# Patient Record
Sex: FEMALE | Race: WHITE | NOT HISPANIC OR LATINO | Employment: OTHER | URBAN - NONMETROPOLITAN AREA
[De-identification: names, ages, dates, MRNs, and addresses within clinical notes are randomized per-mention and may not be internally consistent; named-entity substitution may affect disease eponyms.]

---

## 2021-03-08 ENCOUNTER — IMMUNIZATION (OUTPATIENT)
Dept: VACCINE CLINIC | Facility: HOSPITAL | Age: 66
End: 2021-03-08

## 2021-03-08 PROCEDURE — 91300 HC SARSCOV02 VAC 30MCG/0.3ML IM: CPT | Performed by: INTERNAL MEDICINE

## 2021-03-08 PROCEDURE — 0001A: CPT | Performed by: INTERNAL MEDICINE

## 2021-03-29 ENCOUNTER — IMMUNIZATION (OUTPATIENT)
Dept: VACCINE CLINIC | Facility: HOSPITAL | Age: 66
End: 2021-03-29

## 2021-03-29 PROCEDURE — 91300 HC SARSCOV02 VAC 30MCG/0.3ML IM: CPT | Performed by: INTERNAL MEDICINE

## 2021-03-29 PROCEDURE — 0002A: CPT | Performed by: INTERNAL MEDICINE

## 2021-11-22 ENCOUNTER — TELEPHONE (OUTPATIENT)
Dept: ORTHOPEDIC SURGERY | Facility: CLINIC | Age: 66
End: 2021-11-22

## 2021-11-22 DIAGNOSIS — M25.561 RIGHT KNEE PAIN, UNSPECIFIED CHRONICITY: Primary | ICD-10-CM

## 2021-12-08 ENCOUNTER — TELEPHONE (OUTPATIENT)
Dept: ORTHOPEDIC SURGERY | Facility: CLINIC | Age: 66
End: 2021-12-08

## 2021-12-08 NOTE — TELEPHONE ENCOUNTER
Called to remind patient about her appt with Dr. Valerio tomorrow, she stated that she received a msg reminding her and told me that she will be there. Knows to come in at 2:30.

## 2021-12-09 ENCOUNTER — OFFICE VISIT (OUTPATIENT)
Dept: ORTHOPEDIC SURGERY | Facility: CLINIC | Age: 66
End: 2021-12-09

## 2021-12-09 ENCOUNTER — HOSPITAL ENCOUNTER (OUTPATIENT)
Dept: GENERAL RADIOLOGY | Facility: HOSPITAL | Age: 66
Discharge: HOME | End: 2021-12-09
Admitting: FAMILY MEDICINE

## 2021-12-09 VITALS
BODY MASS INDEX: 47.84 KG/M2 | DIASTOLIC BLOOD PRESSURE: 85 MMHG | SYSTOLIC BLOOD PRESSURE: 154 MMHG | WEIGHT: 260 LBS | HEIGHT: 62 IN | HEART RATE: 68 BPM

## 2021-12-09 DIAGNOSIS — G89.29 CHRONIC PAIN OF RIGHT KNEE: Primary | ICD-10-CM

## 2021-12-09 DIAGNOSIS — M25.561 CHRONIC PAIN OF RIGHT KNEE: Primary | ICD-10-CM

## 2021-12-09 DIAGNOSIS — M17.11 PRIMARY OSTEOARTHRITIS OF RIGHT KNEE: ICD-10-CM

## 2021-12-09 DIAGNOSIS — M25.561 RIGHT KNEE PAIN, UNSPECIFIED CHRONICITY: ICD-10-CM

## 2021-12-09 PROCEDURE — 99203 OFFICE O/P NEW LOW 30 MIN: CPT | Performed by: FAMILY MEDICINE

## 2021-12-09 PROCEDURE — 73562 X-RAY EXAM OF KNEE 3: CPT | Performed by: RADIOLOGY

## 2021-12-09 PROCEDURE — 73562 X-RAY EXAM OF KNEE 3: CPT

## 2021-12-09 RX ORDER — DICLOFENAC SODIUM 75 MG/1
75 TABLET, DELAYED RELEASE ORAL EVERY 12 HOURS PRN
Qty: 60 TABLET | Refills: 2 | Status: SHIPPED | OUTPATIENT
Start: 2021-12-09 | End: 2022-04-13 | Stop reason: SDUPTHER

## 2021-12-09 RX ORDER — DICLOFENAC SODIUM 75 MG/1
TABLET, DELAYED RELEASE ORAL
COMMUNITY
Start: 2021-11-05 | End: 2021-12-09 | Stop reason: SDUPTHER

## 2021-12-09 NOTE — PROGRESS NOTES
New Patient Visit      Patient: Emily Wilkins  YOB: 1955  Date of Encounter: 12/09/2021  PCP: Provider, No Known  Referring Provider: JASPER Eduardo   Emily Wilkins is a 66 y.o. female who presents to the office today for evaluation of Pain and Initial Evaluation of the Right Knee      Chief Complaint   Patient presents with   • Right Knee - Pain, Initial Evaluation       HPI   new patient presents complaining of chronic intermittent right knee pain for many years which has been severe and constant for the last few months without any specific injury.  Right knee seems to swell and is very painful when she tries to bend it.  Patient is having significant debilitating pain until she was started on diclofenac by her PCP which is helping notably.  Now pain is 4-5/10 and better at rest.    There is no problem list on file for this patient.      History reviewed. No pertinent past medical history.    History reviewed. No pertinent surgical history.    History reviewed. No pertinent family history.    Social History     Socioeconomic History   • Marital status: Unknown   Tobacco Use   • Smoking status: Never Smoker   • Smokeless tobacco: Never Used   Vaping Use   • Vaping Use: Never used   Substance and Sexual Activity   • Alcohol use: Never       Current Outpatient Medications   Medication Sig Dispense Refill   • diclofenac (VOLTAREN) 75 MG EC tablet Take 1 tablet by mouth Every 12 (Twelve) Hours As Needed (pain). 60 tablet 2   • Diclofenac Sodium (VOLTAREN) 1 % gel gel        No current facility-administered medications for this visit.       No Known Allergies    Review of Systems   Constitutional: Positive for activity change. Negative for fever.   Respiratory: Negative for shortness of breath and wheezing.    Cardiovascular: Negative for chest pain.   Musculoskeletal: Positive for arthralgias, gait problem, joint swelling and myalgias.   Skin: Negative for color change and wound.  "  Neurological: Negative for weakness and numbness.       Visit Vitals  /85   Pulse 68   Ht 157.5 cm (62\")   Wt 118 kg (260 lb)   BMI 47.55 kg/m²     66 y.o.female  Physical Exam  Vitals and nursing note reviewed.   Constitutional:       General: She is not in acute distress.     Appearance: Normal appearance. She is obese.   Pulmonary:      Effort: Pulmonary effort is normal. No respiratory distress.   Musculoskeletal:      Right hip: No tenderness. Normal range of motion.      Right knee: Swelling, bony tenderness and crepitus present. Decreased range of motion. Tenderness present over the medial joint line. No MCL laxity or ACL laxity. Normal pulse.      Comments: Severely restricted right knee range of motion with pain at end range.  Cannot fully extend and has approximately a 5 degree extension deficit.  Can only flex to about 45 degrees.  Very tender over medial joint line.  Palpation structures somewhat difficult due to body habitus.   Skin:     General: Skin is warm and dry.      Findings: No erythema.   Neurological:      General: No focal deficit present.      Mental Status: She is alert.      Sensory: No sensory deficit.      Motor: No weakness.         Radiology Results:    XR Knee 3 View Right    Result Date: 12/9/2021  Extensive degenerative osteoarthritis.   This report was finalized on 12/9/2021 2:13 PM by Dr. Michael Swain MD.        Assessment/Plan   Diagnoses and all orders for this visit:    1. Chronic pain of right knee (Primary)  -     diclofenac (VOLTAREN) 75 MG EC tablet; Take 1 tablet by mouth Every 12 (Twelve) Hours As Needed (pain).  Dispense: 60 tablet; Refill: 2    2. Primary osteoarthritis of right knee  -     diclofenac (VOLTAREN) 75 MG EC tablet; Take 1 tablet by mouth Every 12 (Twelve) Hours As Needed (pain).  Dispense: 60 tablet; Refill: 2         MEDS ORDERED DURING VISIT:  New Medications Ordered This Visit   Medications   • diclofenac (VOLTAREN) 75 MG EC tablet     Sig: " Take 1 tablet by mouth Every 12 (Twelve) Hours As Needed (pain).     Dispense:  60 tablet     Refill:  2     MEDICATION ISSUES:  Discussed medication options and treatment plan of prescribed medication as well as the risks, benefits, and side effects including potential falls, possible impaired driving and metabolic adversities among others. Patient is agreeable to call the office with any worsening of symptoms or onset of side effects. Patient is agreeable to call 911 or go to the nearest ER should he/she begin having SI/HI.     Discussion:  Patient has fairly severe arthritis of the right knee.  Spent time discussing various treatment options and patient would like to try nonsurgical route to start with.  We will start with home exercises and will continue taking her diclofenac.  Set of bracing, PT, further intervention.  Consider MRI.  Consider surgical referral although patient will need to get her BMI under 40.0 before surgery would be considered.  Follow-up in 1 month             This document has been electronically signed by Cesar Valerio DO   December 12, 2021 23:40 EST    Part of this note may be an electronic transcription/translation of spoken language to printed text using the Dragon Dictation System.

## 2021-12-15 ENCOUNTER — PATIENT ROUNDING (BHMG ONLY) (OUTPATIENT)
Dept: ORTHOPEDIC SURGERY | Facility: CLINIC | Age: 66
End: 2021-12-15

## 2021-12-15 NOTE — PROGRESS NOTES
December 15, 2021    Hello, may I speak with Emily Wilkins    My name is Nunu Patel     I am  with [facility] [place]  [facility]  [address]  [place] [address] [phone].    Before we get started may I verify your date of birth? 1955    I am calling to officially welcome you to our practice and ask about your recent visit. Is this a good time to talk? Yes    Tell me about your visit with us. What things went well?  The visit was fine.       We're always looking for ways to make our patients' experiences even better. Do you have recommendations on ways we may improve?  No    Overall were you satisfied with your first visit to our practice? Yes       I appreciate you taking the time to speak with me today. Is there anything else I can do for you? No      Thank you, and have a great day.

## 2022-04-11 ENCOUNTER — TELEPHONE (OUTPATIENT)
Dept: ORTHOPEDIC SURGERY | Facility: CLINIC | Age: 67
End: 2022-04-11

## 2022-04-11 NOTE — TELEPHONE ENCOUNTER
Called patient to see if she wouldn't mind coming later on wed and I explained it's because of the OMT appt ahead of her needs at least 30 min, said she doesn't mind. She is good to go. Verbalized understanding.

## 2022-04-13 ENCOUNTER — OFFICE VISIT (OUTPATIENT)
Dept: ORTHOPEDIC SURGERY | Facility: CLINIC | Age: 67
End: 2022-04-13

## 2022-04-13 VITALS
WEIGHT: 292.8 LBS | SYSTOLIC BLOOD PRESSURE: 178 MMHG | DIASTOLIC BLOOD PRESSURE: 88 MMHG | TEMPERATURE: 98.6 F | HEIGHT: 62 IN | BODY MASS INDEX: 53.88 KG/M2 | HEART RATE: 73 BPM

## 2022-04-13 DIAGNOSIS — E66.01 OBESITY, MORBID, BMI 50 OR HIGHER: ICD-10-CM

## 2022-04-13 DIAGNOSIS — M25.561 CHRONIC PAIN OF RIGHT KNEE: Primary | ICD-10-CM

## 2022-04-13 DIAGNOSIS — M17.11 PRIMARY OSTEOARTHRITIS OF RIGHT KNEE: ICD-10-CM

## 2022-04-13 DIAGNOSIS — G89.29 CHRONIC PAIN OF RIGHT KNEE: Primary | ICD-10-CM

## 2022-04-13 DIAGNOSIS — Z79.1 NSAID LONG-TERM USE: ICD-10-CM

## 2022-04-13 PROCEDURE — 99214 OFFICE O/P EST MOD 30 MIN: CPT | Performed by: FAMILY MEDICINE

## 2022-04-13 RX ORDER — DICLOFENAC SODIUM 75 MG/1
75 TABLET, DELAYED RELEASE ORAL EVERY 12 HOURS PRN
Qty: 60 TABLET | Refills: 2 | Status: SHIPPED | OUTPATIENT
Start: 2022-04-13 | End: 2022-08-31

## 2022-08-31 ENCOUNTER — OFFICE VISIT (OUTPATIENT)
Dept: ORTHOPEDIC SURGERY | Facility: CLINIC | Age: 67
End: 2022-08-31

## 2022-08-31 VITALS
HEART RATE: 67 BPM | BODY MASS INDEX: 53.92 KG/M2 | HEIGHT: 62 IN | DIASTOLIC BLOOD PRESSURE: 80 MMHG | SYSTOLIC BLOOD PRESSURE: 175 MMHG | WEIGHT: 293 LBS | OXYGEN SATURATION: 97 %

## 2022-08-31 DIAGNOSIS — M25.561 RIGHT KNEE PAIN, UNSPECIFIED CHRONICITY: ICD-10-CM

## 2022-08-31 DIAGNOSIS — E66.01 OBESITY, MORBID, BMI 50 OR HIGHER: ICD-10-CM

## 2022-08-31 DIAGNOSIS — Z79.1 NSAID LONG-TERM USE: ICD-10-CM

## 2022-08-31 DIAGNOSIS — M25.561 CHRONIC PAIN OF RIGHT KNEE: Primary | ICD-10-CM

## 2022-08-31 DIAGNOSIS — G89.29 CHRONIC PAIN OF RIGHT KNEE: Primary | ICD-10-CM

## 2022-08-31 DIAGNOSIS — M17.11 PRIMARY OSTEOARTHRITIS OF RIGHT KNEE: ICD-10-CM

## 2022-08-31 PROCEDURE — 99214 OFFICE O/P EST MOD 30 MIN: CPT | Performed by: FAMILY MEDICINE

## 2022-08-31 RX ORDER — ERGOCALCIFEROL 1.25 MG/1
1 CAPSULE ORAL
COMMUNITY
Start: 2022-08-22

## 2022-08-31 RX ORDER — METHOCARBAMOL 500 MG/1
TABLET, FILM COATED ORAL EVERY 12 HOURS
COMMUNITY
Start: 2022-08-15

## 2022-08-31 RX ORDER — LISINOPRIL 10 MG/1
TABLET ORAL
COMMUNITY
Start: 2022-08-15

## 2022-08-31 RX ORDER — MELOXICAM 7.5 MG/1
7.5-15 TABLET ORAL DAILY
Qty: 60 TABLET | Refills: 0 | Status: SHIPPED | OUTPATIENT
Start: 2022-08-31 | End: 2022-09-27

## 2022-08-31 RX ORDER — CLONIDINE HYDROCHLORIDE 0.1 MG/1
TABLET ORAL
COMMUNITY
Start: 2022-08-15

## 2022-09-26 DIAGNOSIS — M25.561 RIGHT KNEE PAIN, UNSPECIFIED CHRONICITY: ICD-10-CM

## 2022-09-26 DIAGNOSIS — M25.561 CHRONIC PAIN OF RIGHT KNEE: ICD-10-CM

## 2022-09-26 DIAGNOSIS — Z79.1 NSAID LONG-TERM USE: ICD-10-CM

## 2022-09-26 DIAGNOSIS — M17.11 PRIMARY OSTEOARTHRITIS OF RIGHT KNEE: ICD-10-CM

## 2022-09-26 DIAGNOSIS — E66.01 OBESITY, MORBID, BMI 50 OR HIGHER: ICD-10-CM

## 2022-09-26 DIAGNOSIS — G89.29 CHRONIC PAIN OF RIGHT KNEE: ICD-10-CM

## 2022-09-27 RX ORDER — MELOXICAM 15 MG/1
15 TABLET ORAL DAILY
Qty: 30 TABLET | Refills: 1 | Status: SHIPPED | OUTPATIENT
Start: 2022-09-27 | End: 2022-10-05 | Stop reason: SDUPTHER

## 2022-10-05 ENCOUNTER — OFFICE VISIT (OUTPATIENT)
Dept: ORTHOPEDIC SURGERY | Facility: CLINIC | Age: 67
End: 2022-10-05

## 2022-10-05 VITALS
SYSTOLIC BLOOD PRESSURE: 147 MMHG | OXYGEN SATURATION: 100 % | HEART RATE: 57 BPM | DIASTOLIC BLOOD PRESSURE: 80 MMHG | WEIGHT: 293 LBS | HEIGHT: 62 IN | TEMPERATURE: 97.1 F | BODY MASS INDEX: 53.92 KG/M2

## 2022-10-05 DIAGNOSIS — M17.11 PRIMARY OSTEOARTHRITIS OF RIGHT KNEE: ICD-10-CM

## 2022-10-05 DIAGNOSIS — Z79.1 NSAID LONG-TERM USE: ICD-10-CM

## 2022-10-05 DIAGNOSIS — M25.561 CHRONIC PAIN OF RIGHT KNEE: ICD-10-CM

## 2022-10-05 DIAGNOSIS — G89.29 CHRONIC PAIN OF RIGHT KNEE: ICD-10-CM

## 2022-10-05 DIAGNOSIS — M25.561 RIGHT KNEE PAIN, UNSPECIFIED CHRONICITY: ICD-10-CM

## 2022-10-05 DIAGNOSIS — E66.01 OBESITY, MORBID, BMI 50 OR HIGHER: ICD-10-CM

## 2022-10-05 PROCEDURE — 99214 OFFICE O/P EST MOD 30 MIN: CPT | Performed by: FAMILY MEDICINE

## 2022-10-05 RX ORDER — MELOXICAM 15 MG/1
15 TABLET ORAL DAILY
Qty: 30 TABLET | Refills: 2 | Status: SHIPPED | OUTPATIENT
Start: 2022-10-05 | End: 2023-01-11 | Stop reason: SDUPTHER

## 2022-10-05 NOTE — PROGRESS NOTES
"Follow Up Visit      Patient: Emily Wilkins  YOB: 1955  Date of Encounter: 10/05/2022  PCP: Yaquelin Ballard  Referring Provider: No ref. provider found     Subjective   Emily Wilkins is a 67 y.o. female who presents to the office today for evaluation of Pain and Follow-up of the Right Knee      Chief Complaint   Patient presents with   • Right Knee - Pain, Follow-up       HPI    There is no problem list on file for this patient.    The patient presents for follow-up of right knee pain. She was last seen on 8/31/2022. The patient was started on Mobic and given the option to increase the dose to 15 mg. She was also doing home exercises. She has been taking the meloxicam 15 mg with improvement in her pain and denies any GI issues with the medication. She reports her right knee pain has not worsened, but she still has some stiffness. She has been doing the home exercises with improvement. She has not received a brace yet.    History reviewed. No pertinent past medical history.    No Known Allergies    Review of Systems   Constitutional: Positive for activity change. Negative for fever.   Respiratory: Negative for shortness of breath and wheezing.    Cardiovascular: Negative for chest pain.   Musculoskeletal: Positive for arthralgias and myalgias.        Right knee pain.   Skin: Negative for color change and wound.   Neurological: Negative for weakness and numbness.       Visit Vitals  /80   Pulse 57   Temp 97.1 °F (36.2 °C)   Ht 157.5 cm (62\")   Wt 133 kg (293 lb)   SpO2 100%   BMI 53.59 kg/m²     67 y.o.female  Physical Exam  Vitals and nursing note reviewed.   Constitutional:       General: She is not in acute distress.     Appearance: Normal appearance.   Pulmonary:      Effort: Pulmonary effort is normal. No respiratory distress.   Musculoskeletal:      Right knee: Swelling, effusion and bony tenderness present. Decreased range of motion. Tenderness present over the medial joint line and lateral joint " line.      Comments: Full extension  Flexion only to about 45 degrees with pain  Negative New's  No weakness   Skin:     General: Skin is warm and dry.      Findings: No erythema.   Neurological:      General: No focal deficit present.      Mental Status: She is alert.      Sensory: No sensory deficit.      Motor: No weakness.         Radiology Results:    No radiology results for the last 30 days.    Assessment & Plan   Diagnoses and all orders for this visit:    1. Chronic pain of right knee  -     meloxicam (MOBIC) 15 MG tablet; Take 1 tablet by mouth Daily.  Dispense: 30 tablet; Refill: 2    2. Primary osteoarthritis of right knee  -     meloxicam (MOBIC) 15 MG tablet; Take 1 tablet by mouth Daily.  Dispense: 30 tablet; Refill: 2    3. NSAID long-term use  -     meloxicam (MOBIC) 15 MG tablet; Take 1 tablet by mouth Daily.  Dispense: 30 tablet; Refill: 2    4. Obesity, morbid, BMI 50 or higher (HCC)  -     meloxicam (MOBIC) 15 MG tablet; Take 1 tablet by mouth Daily.  Dispense: 30 tablet; Refill: 2    5. Right knee pain, unspecified chronicity  -     meloxicam (MOBIC) 15 MG tablet; Take 1 tablet by mouth Daily.  Dispense: 30 tablet; Refill: 2         MEDS ORDERED DURING VISIT:  New Medications Ordered This Visit   Medications   • meloxicam (MOBIC) 15 MG tablet     Sig: Take 1 tablet by mouth Daily.     Dispense:  30 tablet     Refill:  2     MEDICATION ISSUES:  Discussed medication options and treatment plan of prescribed medication as well as the risks, benefits, and side effects including potential falls, possible impaired driving and metabolic adversities among others. Patient is agreeable to call the office with any worsening of symptoms or onset of side effects. Patient is agreeable to call 911 or go to the nearest ER should he/she begin having SI/HI.     Discussion:    The patient is doing somewhat better while taking Mobic regularly with the pain, making daily activities tolerable, being more active.  It has not resolved her pain and she still has significantly restricted range of motion in the right knee which will continue to impair her gait. She is reluctant to do any further treatments at this time but should continue trying to do things at home with home exercises and NSAIDs. We again discussed that treatments could involve PT, MRIs, injections, getting a custom brace, or surgical referral. Follow up in 3 months or sooner if needed.           This document has been electronically signed by Cesar Valerio DO   October 6, 2022 16:41 EDT    Dictated Utilizing Dragon Dictation: Part of this note may be an electronic transcription/translation of spoken language to printed text using the Dragon Dictation System.    Transcribed from ambient dictation for Cesar Valerio DO by Pura Dixon.  10/05/22   15:00 EDT    Patient verbalized consent to the visit recording.  I have personally performed the services described in this document as transcribed by the above individual, and it is both accurate and complete.

## 2023-01-11 ENCOUNTER — OFFICE VISIT (OUTPATIENT)
Dept: ORTHOPEDIC SURGERY | Facility: CLINIC | Age: 68
End: 2023-01-11
Payer: MEDICARE

## 2023-01-11 VITALS
HEART RATE: 73 BPM | OXYGEN SATURATION: 97 % | HEIGHT: 62 IN | WEIGHT: 293 LBS | SYSTOLIC BLOOD PRESSURE: 135 MMHG | TEMPERATURE: 97.1 F | DIASTOLIC BLOOD PRESSURE: 73 MMHG | BODY MASS INDEX: 53.92 KG/M2

## 2023-01-11 DIAGNOSIS — M25.561 CHRONIC PAIN OF RIGHT KNEE: ICD-10-CM

## 2023-01-11 DIAGNOSIS — M17.11 PRIMARY OSTEOARTHRITIS OF RIGHT KNEE: ICD-10-CM

## 2023-01-11 DIAGNOSIS — G89.29 CHRONIC PAIN OF RIGHT KNEE: ICD-10-CM

## 2023-01-11 DIAGNOSIS — E66.01 OBESITY, MORBID, BMI 50 OR HIGHER: ICD-10-CM

## 2023-01-11 DIAGNOSIS — M25.561 RIGHT KNEE PAIN, UNSPECIFIED CHRONICITY: ICD-10-CM

## 2023-01-11 DIAGNOSIS — Z79.1 NSAID LONG-TERM USE: ICD-10-CM

## 2023-01-11 PROCEDURE — 99214 OFFICE O/P EST MOD 30 MIN: CPT | Performed by: FAMILY MEDICINE

## 2023-01-11 RX ORDER — MELOXICAM 15 MG/1
15 TABLET ORAL DAILY
Qty: 30 TABLET | Refills: 2 | Status: SHIPPED | OUTPATIENT
Start: 2023-01-11

## 2023-01-11 NOTE — PROGRESS NOTES
"Follow Up Visit      Patient: Emily Wilkins  YOB: 1955  Date of Encounter: 01/11/2023  PCP: Yaquelin Ballard  Referring Provider: No ref. provider found     Subjective   Emily Wilkins is a 67 y.o. female who presents to the office today for evaluation of Follow-up of the Right Knee      Chief Complaint   Patient presents with   • Right Knee - Follow-up       HPI     Patient presents for follow-up for chronic right knee pain. Patient reports no improvement or worsening of knee pain due to severe arthritis. Taking Mobic and using topical Voltaren gives her relief. She needs refills of both. Had recent lab work from primary care provider to check kidney function and other things which we need to request. Currently 3/10 pain but gets worse by the end of the day and sometimes severe. Her knee is still quite stiff. Patient is morbidly obese.    There is no problem list on file for this patient.      History reviewed. No pertinent past medical history.    No Known Allergies    Review of Systems   Constitutional: Positive for activity change. Negative for fever.   Respiratory: Negative for shortness of breath and wheezing.    Cardiovascular: Negative for chest pain.   Musculoskeletal: Positive for arthralgias and myalgias.   Skin: Negative for color change and wound.   Neurological: Negative for weakness and numbness.       Visit Vitals  /73 (BP Location: Right arm, Patient Position: Sitting, Cuff Size: Adult)   Pulse 73   Temp 97.1 °F (36.2 °C)   Ht 157.5 cm (62\")   Wt 135 kg (296 lb 9.6 oz)   SpO2 97%   BMI 54.25 kg/m²     67 y.o.female  Physical Exam  Vitals and nursing note reviewed.   Constitutional:       General: She is not in acute distress.     Appearance: Normal appearance.   Pulmonary:      Effort: Pulmonary effort is normal. No respiratory distress.   Skin:     General: Skin is warm and dry.      Findings: No erythema.   Neurological:      General: No focal deficit present.      Mental Status: She is " alert.      Sensory: No sensory deficit.      Motor: No weakness.     Ortho exam:    Right knee exam:    1+ effusion.  Tender on medial and lateral joint line.  Full extension with loss of hyperextension.  Flexion was severely restricted to only about 45 degrees with end range pain.  Pain on resisted testing without strength.  Patient can not flex the knee enough for further specific knee testing.    Radiology Results:    No radiology results for the last 30 days.  Labs 8/16/2022 with magnesium 2.4 high and B12 1204, DENYS negative, vitamin D11.6 Low, rheumatoid factor negative, TSH 1.2, hemoglobin A1c 6.4, BUN 14, creatinine 0.9, GFR 71, hemoglobin 13.9, WBC 6.1  Assessment & Plan   Diagnoses and all orders for this visit:    1. Chronic pain of right knee  -     meloxicam (MOBIC) 15 MG tablet; Take 1 tablet by mouth Daily.  Dispense: 30 tablet; Refill: 2  -     Diclofenac Sodium (VOLTAREN) 1 % gel gel; Apply  topically to the appropriate area as directed 4 (Four) Times a Day As Needed (knee pain). 4g to knee QID prn.  Dispense: 350 g; Refill: 5    2. Primary osteoarthritis of right knee  -     meloxicam (MOBIC) 15 MG tablet; Take 1 tablet by mouth Daily.  Dispense: 30 tablet; Refill: 2  -     Diclofenac Sodium (VOLTAREN) 1 % gel gel; Apply  topically to the appropriate area as directed 4 (Four) Times a Day As Needed (knee pain). 4g to knee QID prn.  Dispense: 350 g; Refill: 5    3. NSAID long-term use  -     meloxicam (MOBIC) 15 MG tablet; Take 1 tablet by mouth Daily.  Dispense: 30 tablet; Refill: 2  -     Diclofenac Sodium (VOLTAREN) 1 % gel gel; Apply  topically to the appropriate area as directed 4 (Four) Times a Day As Needed (knee pain). 4g to knee QID prn.  Dispense: 350 g; Refill: 5    4. Obesity, morbid, BMI 50 or higher (HCC)  -     meloxicam (MOBIC) 15 MG tablet; Take 1 tablet by mouth Daily.  Dispense: 30 tablet; Refill: 2  -     Diclofenac Sodium (VOLTAREN) 1 % gel gel; Apply  topically to the  appropriate area as directed 4 (Four) Times a Day As Needed (knee pain). 4g to knee QID prn.  Dispense: 350 g; Refill: 5    5. Right knee pain, unspecified chronicity  -     meloxicam (MOBIC) 15 MG tablet; Take 1 tablet by mouth Daily.  Dispense: 30 tablet; Refill: 2  -     Diclofenac Sodium (VOLTAREN) 1 % gel gel; Apply  topically to the appropriate area as directed 4 (Four) Times a Day As Needed (knee pain). 4g to knee QID prn.  Dispense: 350 g; Refill: 5         MEDS ORDERED DURING VISIT:  New Medications Ordered This Visit   Medications   • meloxicam (MOBIC) 15 MG tablet     Sig: Take 1 tablet by mouth Daily.     Dispense:  30 tablet     Refill:  2   • Diclofenac Sodium (VOLTAREN) 1 % gel gel     Sig: Apply  topically to the appropriate area as directed 4 (Four) Times a Day As Needed (knee pain). 4g to knee QID prn.     Dispense:  350 g     Refill:  5     MEDICATION ISSUES:  Discussed medication options and treatment plan of prescribed medication as well as the risks, benefits, and side effects including potential falls, possible impaired driving and metabolic adversities among others. Patient is agreeable to call the office with any worsening of symptoms or onset of side effects. Patient is agreeable to call 911 or go to the nearest ER should he/she begin having SI/HI.     Discussion:  Discussed consequences of long term non-steroidal anti-inflammatory drug use and we will request recent renal function labs that she had done last week. Refilled medicines. Discussed ongoing treatment options including injections, physical therapy, magnetic resonance imaging and ultimately knee replacement which is probably what the patient needs but she would need to get down to a body mass index of under 40 before this would be possible. Patient declines all these today stating she would like to continue with the current treatment for now. Patient will follow up in 3 months or sooner if needed.             This document has  been electronically signed by Cesar Valerio DO     January 11, 2023 14:32 EST    Dictated Utilizing Dragon Dictation: Part of this note may be an electronic transcription/translation of spoken language to printed text using the Dragon Dictation System.    Transcribed from ambient dictation for Cesar Valerio DO by Margarette Bautista.  01/11/23   14:37 EST    I have personally performed the services described in this document as transcribed by the above individual, and it is both accurate and complete.

## 2023-01-23 DIAGNOSIS — M25.561 RIGHT KNEE PAIN, UNSPECIFIED CHRONICITY: ICD-10-CM

## 2023-01-23 DIAGNOSIS — Z79.1 NSAID LONG-TERM USE: ICD-10-CM

## 2023-01-23 DIAGNOSIS — M25.561 CHRONIC PAIN OF RIGHT KNEE: ICD-10-CM

## 2023-01-23 DIAGNOSIS — E66.01 OBESITY, MORBID, BMI 50 OR HIGHER: ICD-10-CM

## 2023-01-23 DIAGNOSIS — M17.11 PRIMARY OSTEOARTHRITIS OF RIGHT KNEE: ICD-10-CM

## 2023-01-23 DIAGNOSIS — G89.29 CHRONIC PAIN OF RIGHT KNEE: ICD-10-CM

## 2023-01-23 RX ORDER — MELOXICAM 15 MG/1
TABLET ORAL
Qty: 90 TABLET | OUTPATIENT
Start: 2023-01-23

## 2023-04-12 ENCOUNTER — HOSPITAL ENCOUNTER (OUTPATIENT)
Dept: ULTRASOUND IMAGING | Facility: HOSPITAL | Age: 68
Discharge: HOME | End: 2023-04-12
Admitting: FAMILY MEDICINE
Payer: MEDICARE

## 2023-04-12 ENCOUNTER — OFFICE VISIT (OUTPATIENT)
Dept: ORTHOPEDIC SURGERY | Facility: CLINIC | Age: 68
End: 2023-04-12
Payer: MEDICARE

## 2023-04-12 VITALS
BODY MASS INDEX: 53.37 KG/M2 | WEIGHT: 290 LBS | HEART RATE: 73 BPM | SYSTOLIC BLOOD PRESSURE: 148 MMHG | OXYGEN SATURATION: 97 % | HEIGHT: 62 IN | DIASTOLIC BLOOD PRESSURE: 81 MMHG

## 2023-04-12 DIAGNOSIS — E66.01 OBESITY, MORBID, BMI 50 OR HIGHER: ICD-10-CM

## 2023-04-12 DIAGNOSIS — M17.11 PRIMARY OSTEOARTHRITIS OF RIGHT KNEE: ICD-10-CM

## 2023-04-12 DIAGNOSIS — M79.661 BILATERAL CALF PAIN: ICD-10-CM

## 2023-04-12 DIAGNOSIS — M79.89 CALF SWELLING: ICD-10-CM

## 2023-04-12 DIAGNOSIS — M25.561 CHRONIC PAIN OF RIGHT KNEE: Primary | ICD-10-CM

## 2023-04-12 DIAGNOSIS — M25.561 RIGHT KNEE PAIN, UNSPECIFIED CHRONICITY: ICD-10-CM

## 2023-04-12 DIAGNOSIS — M79.662 BILATERAL CALF PAIN: ICD-10-CM

## 2023-04-12 DIAGNOSIS — Z79.1 NSAID LONG-TERM USE: ICD-10-CM

## 2023-04-12 DIAGNOSIS — G89.29 CHRONIC PAIN OF RIGHT KNEE: Primary | ICD-10-CM

## 2023-04-12 PROCEDURE — 93970 EXTREMITY STUDY: CPT | Performed by: RADIOLOGY

## 2023-04-12 PROCEDURE — 93970 EXTREMITY STUDY: CPT

## 2023-04-12 RX ORDER — TRAMADOL HYDROCHLORIDE 50 MG/1
TABLET ORAL EVERY 12 HOURS
COMMUNITY
Start: 2023-04-04

## 2023-04-12 RX ORDER — MELOXICAM 15 MG/1
15 TABLET ORAL DAILY
Qty: 30 TABLET | Refills: 5 | Status: SHIPPED | OUTPATIENT
Start: 2023-04-12

## 2023-04-12 NOTE — PROGRESS NOTES
"Follow Up Visit      Patient: Emily Wilkins  YOB: 1955  Date of Encounter: 04/12/2023  PCP: Yaquelin Ballard  Referring Provider: No ref. provider found     Subjective   Emily Wilkins is a 67 y.o. female who presents to the office today for evaluation of Follow-up, Pain, and Edema of the Right Knee      Chief Complaint   Patient presents with   • Right Knee - Follow-up, Pain, Edema       HPI   The patient presents for follow up for right knee pain. The patient has been performing her home exercises and using Mobic which keeps her pain tolerable, but she still has significant pain in the bilateral knees as well as stiffness. The patient has been working on weight loss in the hopes of receiving a total right knee arthroplasty and has also lost approximately 6 pounds. The patient also notes that she has been having pain and swelling in the bilateral lower extremities for several years but has never had an ultrasound for evaluation.     There is no problem list on file for this patient.      Past Medical History:   Diagnosis Date   • Hypertension        No Known Allergies    Review of Systems   Constitutional: Positive for activity change. Negative for fever.   Respiratory: Negative for shortness of breath and wheezing.    Cardiovascular: Positive for leg swelling. Negative for chest pain.   Musculoskeletal: Positive for arthralgias, gait problem, joint swelling and myalgias.   Skin: Negative for color change and wound.   Neurological: Negative for weakness and numbness.       Visit Vitals  /81 (BP Location: Right arm, Patient Position: Sitting, Cuff Size: Adult)   Pulse 73   Ht 157.5 cm (62.01\")   Wt 132 kg (290 lb)   SpO2 97%   BMI 53.03 kg/m²     67 y.o.female  Physical Exam  Vitals and nursing note reviewed.   Constitutional:       General: She is not in acute distress.     Appearance: Normal appearance. She is obese.   Cardiovascular:      Pulses:           Dorsalis pedis pulses are 2+ on the right side " and 2+ on the left side.   Pulmonary:      Effort: Pulmonary effort is normal. No respiratory distress.   Musculoskeletal:      Right knee: Effusion present. No erythema. Decreased range of motion. Tenderness present over the medial joint line and lateral joint line.      Right lower leg: Tenderness present. 1+ Edema present.      Left lower leg: Tenderness present. 1+ Edema present.      Comments: Right knee has severely restricted range of motion with full extension and flexion to only around 30 degrees.  Tender patellar facets. Exam is difficult due to body habitus. Cannot flex the right knee enough to do any further ligament examination. Flexor and extensor mechanism are intact. No cellulitis, redness, or warmth.  Tender musculature of the calf bilaterally with no focused firmness, cellulitis, redness, or heat.  Positive Cedric sign bilaterally.   Skin:     General: Skin is warm and dry.      Findings: No erythema.   Neurological:      General: No focal deficit present.      Mental Status: She is alert.      Sensory: No sensory deficit.      Motor: No weakness.         Radiology Results:    US Venous Doppler Lower Extremity Bilateral (duplex)    Result Date: 4/13/2023  No DVT.  This report was finalized on 4/13/2023 8:55 AM by Dr. Ray Varela MD.        Assessment & Plan   Diagnoses and all orders for this visit:    1. Chronic pain of right knee (Primary)  -     meloxicam (MOBIC) 15 MG tablet; Take 1 tablet by mouth Daily.  Dispense: 30 tablet; Refill: 5    2. Primary osteoarthritis of right knee  -     meloxicam (MOBIC) 15 MG tablet; Take 1 tablet by mouth Daily.  Dispense: 30 tablet; Refill: 5    3. NSAID long-term use  -     meloxicam (MOBIC) 15 MG tablet; Take 1 tablet by mouth Daily.  Dispense: 30 tablet; Refill: 5    4. Obesity, morbid, BMI 50 or higher  -     meloxicam (MOBIC) 15 MG tablet; Take 1 tablet by mouth Daily.  Dispense: 30 tablet; Refill: 5  -     US Venous Doppler Lower Extremity Bilateral  (duplex); Future    5. Right knee pain, unspecified chronicity  -     meloxicam (MOBIC) 15 MG tablet; Take 1 tablet by mouth Daily.  Dispense: 30 tablet; Refill: 5    6. Bilateral calf pain  -     US Venous Doppler Lower Extremity Bilateral (duplex); Future    7. Calf swelling  -     US Venous Doppler Lower Extremity Bilateral (duplex); Future         MEDS ORDERED DURING VISIT:  New Medications Ordered This Visit   Medications   • meloxicam (MOBIC) 15 MG tablet     Sig: Take 1 tablet by mouth Daily.     Dispense:  30 tablet     Refill:  5     MEDICATION ISSUES:  Discussed medication options and treatment plan of prescribed medication as well as the risks, benefits, and side effects including potential falls, possible impaired driving and metabolic adversities among others. Patient is agreeable to call the office with any worsening of symptoms or onset of side effects. Patient is agreeable to call 911 or go to the nearest ER should he/she begin having SI/HI.     Discussion:  I discussed treatment options with the patient for her right knee including injection, physical therapy, and genicular nerve blocks. The patient declines all or these at this time wanting only to continue with her NSAID and exercises while trying to lose weight in hopes of having surgery. The patient's Mobic was refilled for 6 months and the patient states that she just had blood work done; we will request the results from the PCP. The patient has chronic pain and swelling in her bilateral legs which is unlikely to be related to a DVT, although it has never been checked per the patient. I recommend getting a venous Doppler ultrasound to evaluate the veins in both legs. We will call the patient with the results, which I expect will be negative. Follow up in 6 months or sooner if needed if further treatment is desired or symptoms worsen.              This document has been electronically signed by Cesar Valerio DO   April 12, 2023 17:31  EDT    Dictated Utilizing Dragon Dictation: Part of this note may be an electronic transcription/translation of spoken language to printed text using the Dragon Dictation System.    Transcribed from ambient dictation for Cesar Valerio DO by Georgie Da Silva.  04/12/23   19:59 EDT    I have personally performed the services described in this document as transcribed by the above individual, and it is both accurate and complete.

## 2023-04-13 ENCOUNTER — TELEPHONE (OUTPATIENT)
Dept: ORTHOPEDIC SURGERY | Facility: CLINIC | Age: 68
End: 2023-04-13
Payer: MEDICARE

## 2023-04-13 NOTE — TELEPHONE ENCOUNTER
----- Message from Cesar Valerio DO sent at 4/13/2023  8:59 AM EDT -----  Please call the patient regarding her normal result. No blood clot

## 2023-04-17 ENCOUNTER — TELEPHONE (OUTPATIENT)
Dept: ORTHOPEDIC SURGERY | Facility: CLINIC | Age: 68
End: 2023-04-17
Payer: MEDICARE

## 2023-04-17 NOTE — TELEPHONE ENCOUNTER
Patient returned call and [facility] for a returned call. Called the patient back and gave her the normal results. Patient is scheduled to come back in 6 months already. Patient verbalized understanding.

## 2023-04-19 ENCOUNTER — TELEPHONE (OUTPATIENT)
Dept: ORTHOPEDIC SURGERY | Facility: CLINIC | Age: 68
End: 2023-04-19
Payer: MEDICARE

## 2023-04-19 NOTE — TELEPHONE ENCOUNTER
I contacted the patients PCP and requested that the most recent lab work be faxed to us at [phone]. Sandee De Los Santos MA

## 2023-10-18 ENCOUNTER — HOSPITAL ENCOUNTER (OUTPATIENT)
Dept: GENERAL RADIOLOGY | Facility: HOSPITAL | Age: 68
Discharge: HOME | End: 2023-10-18
Admitting: FAMILY MEDICINE
Payer: MEDICARE

## 2023-10-18 ENCOUNTER — OFFICE VISIT (OUTPATIENT)
Dept: ORTHOPEDIC SURGERY | Facility: CLINIC | Age: 68
End: 2023-10-18
Payer: MEDICARE

## 2023-10-18 VITALS
HEIGHT: 62 IN | WEIGHT: 290 LBS | SYSTOLIC BLOOD PRESSURE: 143 MMHG | DIASTOLIC BLOOD PRESSURE: 83 MMHG | HEART RATE: 67 BPM | OXYGEN SATURATION: 97 % | BODY MASS INDEX: 53.37 KG/M2

## 2023-10-18 DIAGNOSIS — E66.01 OBESITY, MORBID, BMI 50 OR HIGHER: ICD-10-CM

## 2023-10-18 DIAGNOSIS — G89.29 CHRONIC PAIN OF RIGHT KNEE: Primary | ICD-10-CM

## 2023-10-18 DIAGNOSIS — M25.561 CHRONIC PAIN OF RIGHT KNEE: ICD-10-CM

## 2023-10-18 DIAGNOSIS — M17.11 PRIMARY OSTEOARTHRITIS OF RIGHT KNEE: ICD-10-CM

## 2023-10-18 DIAGNOSIS — M25.561 CHRONIC PAIN OF RIGHT KNEE: Primary | ICD-10-CM

## 2023-10-18 DIAGNOSIS — G89.29 CHRONIC PAIN OF RIGHT KNEE: ICD-10-CM

## 2023-10-18 DIAGNOSIS — M25.561 RIGHT KNEE PAIN, UNSPECIFIED CHRONICITY: ICD-10-CM

## 2023-10-18 DIAGNOSIS — Z79.1 NSAID LONG-TERM USE: ICD-10-CM

## 2023-10-18 PROCEDURE — 99214 OFFICE O/P EST MOD 30 MIN: CPT | Performed by: FAMILY MEDICINE

## 2023-10-18 PROCEDURE — 73562 X-RAY EXAM OF KNEE 3: CPT

## 2023-10-18 PROCEDURE — 73562 X-RAY EXAM OF KNEE 3: CPT | Performed by: RADIOLOGY

## 2023-10-18 RX ORDER — MELOXICAM 15 MG/1
15 TABLET ORAL DAILY
Qty: 30 TABLET | Refills: 5 | Status: SHIPPED | OUTPATIENT
Start: 2023-10-18

## 2023-10-18 NOTE — PROGRESS NOTES
"Follow Up Visit      Patient: Emily Wilkins  YOB: 1955  Date of Encounter: 10/18/2023  PCP: Yaquelin Ballard  Referring Provider: No ref. provider found     Subjective   Emily Wilkins is a 68 y.o. female who presents to the office today for evaluation of Pain and Follow-up of the Right Knee      Chief Complaint   Patient presents with    Right Knee - Pain, Follow-up       HPI    The patient presents for follow-up for chronic right knee pain and arthritis. She reports no change in symptoms. She continues to get severe stiffness and pain, worse with activity. She has been taking Mobic which helps. Patient states that she has continued to lose weight in hopes of getting a knee replacement.    There is no problem list on file for this patient.      Past Medical History:   Diagnosis Date    Hypertension        No Known Allergies    Review of Systems   Constitutional:  Positive for activity change. Negative for fever.   Respiratory:  Negative for shortness of breath and wheezing.    Cardiovascular:  Positive for leg swelling. Negative for chest pain.   Musculoskeletal:  Positive for arthralgias, gait problem, joint swelling and myalgias.   Skin:  Negative for color change and wound.   Neurological:  Negative for weakness and numbness.       Visit Vitals  /83 (BP Location: Left arm, Patient Position: Sitting, Cuff Size: Adult)   Pulse 67   Ht 157.5 cm (62.01\")   Wt 132 kg (290 lb)   SpO2 97%   BMI 53.03 kg/m²     68 y.o.female  Physical Exam  Vitals and nursing note reviewed.   Constitutional:       General: She is not in acute distress.     Appearance: Normal appearance. She is obese.   Cardiovascular:      Pulses:           Dorsalis pedis pulses are 2+ on the right side and 2+ on the left side.   Pulmonary:      Effort: Pulmonary effort is normal. No respiratory distress.   Musculoskeletal:      Right knee: Effusion present. No erythema. Decreased range of motion. Tenderness present over the medial joint line " and lateral joint line.      Right lower leg: Tenderness present. 1+ Edema present.      Left lower leg: Tenderness present. 1+ Edema present.      Comments: Right knee has severely restricted range of motion with full extension and flexion to only around 35 degrees.  Tender patellar facets and medial lateral joint line. Exam is difficult due to body habitus patient is examined in the chair. Cannot flex the right knee enough to do any further ligament examination. Flexor and extensor mechanism are intact. No cellulitis, redness, or warmth.     Skin:     General: Skin is warm and dry.      Findings: No erythema.   Neurological:      General: No focal deficit present.      Mental Status: She is alert.      Sensory: No sensory deficit.      Motor: No weakness.         Radiology Results:    No radiology results for the last 30 days.    Assessment & Plan   Diagnoses and all orders for this visit:    1. Chronic pain of right knee (Primary)  -     XR Knee 3+ View With Sunrise Right; Future  -     meloxicam (MOBIC) 15 MG tablet; Take 1 tablet by mouth Daily.  Dispense: 30 tablet; Refill: 5    2. Primary osteoarthritis of right knee  -     XR Knee 3+ View With Sunrise Right; Future  -     meloxicam (MOBIC) 15 MG tablet; Take 1 tablet by mouth Daily.  Dispense: 30 tablet; Refill: 5    3. NSAID long-term use  -     meloxicam (MOBIC) 15 MG tablet; Take 1 tablet by mouth Daily.  Dispense: 30 tablet; Refill: 5    4. Obesity, morbid, BMI 50 or higher  -     XR Knee 3+ View With Sunrise Right; Future  -     meloxicam (MOBIC) 15 MG tablet; Take 1 tablet by mouth Daily.  Dispense: 30 tablet; Refill: 5    5. Right knee pain, unspecified chronicity  -     meloxicam (MOBIC) 15 MG tablet; Take 1 tablet by mouth Daily.  Dispense: 30 tablet; Refill: 5         MEDS ORDERED DURING VISIT:  No orders of the defined types were placed in this encounter.    MEDICATION ISSUES:  Discussed medication options and treatment plan of prescribed  medication as well as the risks, benefits, and side effects including potential falls, possible impaired driving and metabolic adversities among others. Patient is agreeable to call the office with any worsening of symptoms or onset of side effects. Patient is agreeable to call 911 or go to the nearest ER should he/she begin having SI/HI.     Discussion:  New knee xray today.    The patient continues to have severe end stage knee arthritis with significant difficulty ambulating requiring a cane with an arm brace. I am refilling the patient's meloxicam which is helping; however, I do need to get recent lab work for the patient. She does have mildly decreased renal function and needs to be monitored. We discussed further treatment options including injections, physical therapy, nerve blocks, and others but the patient declined all of these and wants to continue with just the Mobic. The patient will discuss medications to help with weight loss with her PCP. Follow up in 6 months or sooner if needed.           This document has been electronically signed by Cesar Valerio DO   October 18, 2023 13:14 EDT    Dictated Utilizing Dragon Dictation: Part of this note may be an electronic transcription/translation of spoken language to printed text using the Dragon Dictation System.    Transcribed from ambient dictation for Cesar Valerio DO by Lin Jackman.  15:21 EDT    I have personally performed the services described in this document as transcribed by the above individual, and it is both accurate and complete.

## 2023-10-20 ENCOUNTER — TELEPHONE (OUTPATIENT)
Dept: ORTHOPEDIC SURGERY | Facility: CLINIC | Age: 68
End: 2023-10-20
Payer: MEDICARE

## 2023-10-20 NOTE — TELEPHONE ENCOUNTER
----- Message from Cesar Valerio DO sent at 10/19/2023  9:53 AM EDT -----  Please call the patient regarding her abnormal result.  X-ray shows continued severe arthritis in the knee worst in the medial compartment.  Similar to previous x-rays.

## 2023-10-23 ENCOUNTER — TELEPHONE (OUTPATIENT)
Dept: ORTHOPEDIC SURGERY | Facility: CLINIC | Age: 68
End: 2023-10-23
Payer: MEDICARE